# Patient Record
Sex: FEMALE | URBAN - METROPOLITAN AREA
[De-identification: names, ages, dates, MRNs, and addresses within clinical notes are randomized per-mention and may not be internally consistent; named-entity substitution may affect disease eponyms.]

---

## 2021-06-10 ENCOUNTER — APPOINTMENT (RX ONLY)
Dept: URBAN - METROPOLITAN AREA CLINIC 87 | Facility: CLINIC | Age: 31
Setting detail: DERMATOLOGY
End: 2021-06-10

## 2021-06-10 DIAGNOSIS — L20.89 OTHER ATOPIC DERMATITIS: ICD-10-CM

## 2021-06-10 PROBLEM — L20.84 INTRINSIC (ALLERGIC) ECZEMA: Status: ACTIVE | Noted: 2021-06-10

## 2021-06-10 PROCEDURE — ? PRESCRIPTION

## 2021-06-10 PROCEDURE — ? TREATMENT REGIMEN

## 2021-06-10 PROCEDURE — ? COUNSELING

## 2021-06-10 PROCEDURE — 99203 OFFICE O/P NEW LOW 30 MIN: CPT

## 2021-06-10 RX ORDER — TRIAMCINOLONE ACETONIDE OINTMENT 0.5 MG/G
OINTMENT TOPICAL
Qty: 1 | Refills: 2 | Status: ERX | COMMUNITY
Start: 2021-06-10

## 2021-06-10 RX ORDER — DESOXIMETASONE 2.5 MG/G
OINTMENT TOPICAL
Qty: 1 | Refills: 2 | Status: ERX | COMMUNITY
Start: 2021-06-10

## 2021-06-10 RX ORDER — MUPIROCIN 20 MG/G
OINTMENT TOPICAL
Qty: 1 | Refills: 2 | Status: ERX | COMMUNITY
Start: 2021-06-10

## 2021-06-10 RX ORDER — TACROLIMUS 1 MG/G
OINTMENT TOPICAL
Qty: 1 | Refills: 5 | Status: ERX | COMMUNITY
Start: 2021-06-10

## 2021-06-10 RX ORDER — PREDNISONE 20 MG/1
TABLET ORAL
Qty: 8 | Refills: 5 | Status: ERX | COMMUNITY
Start: 2021-06-10

## 2021-06-10 RX ADMIN — PREDNISONE: 20 TABLET ORAL at 00:00

## 2021-06-10 RX ADMIN — DESOXIMETASONE: 2.5 OINTMENT TOPICAL at 00:00

## 2021-06-10 RX ADMIN — TACROLIMUS: 1 OINTMENT TOPICAL at 00:00

## 2021-06-10 RX ADMIN — TRIAMCINOLONE ACETONIDE OINTMENT: 0.5 OINTMENT TOPICAL at 00:00

## 2021-06-10 RX ADMIN — MUPIROCIN: 20 OINTMENT TOPICAL at 00:00

## 2021-06-10 ASSESSMENT — LOCATION DETAILED DESCRIPTION DERM
LOCATION DETAILED: LEFT ULNAR DORSAL HAND
LOCATION DETAILED: LEFT DISTAL DORSAL FOREARM
LOCATION DETAILED: RIGHT DISTAL DORSAL FOREARM
LOCATION DETAILED: RIGHT RADIAL DORSAL HAND

## 2021-06-10 ASSESSMENT — LOCATION SIMPLE DESCRIPTION DERM
LOCATION SIMPLE: RIGHT FOREARM
LOCATION SIMPLE: LEFT FOREARM
LOCATION SIMPLE: RIGHT HAND
LOCATION SIMPLE: LEFT HAND

## 2021-06-10 ASSESSMENT — LOCATION ZONE DERM
LOCATION ZONE: HAND
LOCATION ZONE: ARM

## 2021-06-10 NOTE — PROCEDURE: TREATMENT REGIMEN
Detail Level: Zone
Initiate Treatment: Desoximetasone ointment BID prn up to 2 weeks for moderate severe flares\\nTriamcinolone ointment BID prn for mild—moderate flares\\nTacrolimus ointment BID prn for mild flares and for prophylaxis\\nMupirocin ointment for open eroded lesions BID prn
Plan: Pt will also be given a 4day supply of 40mg prednisone for very difficult flares.

## 2021-06-10 NOTE — HPI: RASH (ECZEMA)
How Severe Is Your Eczema?: mild
Is This A New Presentation, Or A Follow-Up?: Rash
Additional History: Pt reports that the creams work when she uses them, but she has run out and is flaring.